# Patient Record
Sex: FEMALE | Race: NATIVE HAWAIIAN OR OTHER PACIFIC ISLANDER | Employment: UNEMPLOYED | URBAN - METROPOLITAN AREA
[De-identification: names, ages, dates, MRNs, and addresses within clinical notes are randomized per-mention and may not be internally consistent; named-entity substitution may affect disease eponyms.]

---

## 2020-11-03 ENCOUNTER — OFFICE VISIT (OUTPATIENT)
Dept: INTERNAL MEDICINE | Facility: CLINIC | Age: 35
End: 2020-11-03

## 2020-11-03 VITALS
HEART RATE: 109 BPM | TEMPERATURE: 99.1 F | DIASTOLIC BLOOD PRESSURE: 64 MMHG | HEIGHT: 68 IN | OXYGEN SATURATION: 99 % | RESPIRATION RATE: 16 BRPM | BODY MASS INDEX: 24.64 KG/M2 | WEIGHT: 162.6 LBS | SYSTOLIC BLOOD PRESSURE: 102 MMHG

## 2020-11-03 DIAGNOSIS — S22.41XD CLOSED FRACTURE OF MULTIPLE RIBS OF RIGHT SIDE WITH ROUTINE HEALING, SUBSEQUENT ENCOUNTER: Primary | ICD-10-CM

## 2020-11-03 DIAGNOSIS — M25.562 ACUTE PAIN OF LEFT KNEE: ICD-10-CM

## 2020-11-03 PROCEDURE — 99203 OFFICE O/P NEW LOW 30 MIN: CPT | Performed by: NURSE PRACTITIONER

## 2020-11-03 RX ORDER — ACETAMINOPHEN 500 MG
1000 TABLET ORAL EVERY 6 HOURS PRN
COMMUNITY

## 2020-11-03 RX ORDER — OXYCODONE HYDROCHLORIDE 5 MG/1
5 TABLET ORAL EVERY 6 HOURS PRN
Qty: 10 TABLET | Refills: 0 | Status: SHIPPED | OUTPATIENT
Start: 2020-11-03 | End: 2020-11-06

## 2020-11-03 SDOH — HEALTH STABILITY: MENTAL HEALTH: HOW OFTEN DO YOU HAVE A DRINK CONTAINING ALCOHOL?: NEVER

## 2020-11-03 ASSESSMENT — MIFFLIN-ST. JEOR: SCORE: 1471.05

## 2020-11-03 NOTE — NURSING NOTE
"patient fell of scafolding yesterday at work, fell 8 feet, has a few broken ribs and left knee pain.  Vital signs:  Temp: 99.1  F (37.3  C) Temp src: Oral BP: 102/64 Pulse: 109   Resp: 16 SpO2: 99 %     Height: 172.7 cm (5' 8\") Weight: 73.8 kg (162 lb 9.6 oz)  Estimated body mass index is 24.72 kg/m  as calculated from the following:    Height as of this encounter: 1.727 m (5' 8\").    Weight as of this encounter: 73.8 kg (162 lb 9.6 oz).          "

## 2020-11-03 NOTE — PROGRESS NOTES
"Subjective     Steffanie Martin is a 35 year old female who presents to clinic today for the following health issues:    HPI         Musculoskeletal problem/pain  Onset/Duration: 2 days  Description  Location: knee - left  Joint Swelling: YES  Redness: no  Pain: YES  Warmth: no  Intensity:  moderate  Progression of Symptoms:  same  Accompanying signs and symptoms:   Fevers: no  Numbness/tingling/weakness: no  History  Trauma to the area: YES- fell 8 feet off of scaffolding at work site, seen at , noted normal knee xrays, 3 rib fractures on R    Recent illness:  no  Previous similar problem: no  Previous evaluation:  YES, outside of [facility]  Precipitating or alleviating factors:  Aggravating factors include: walking  Therapies tried and outcome: oxycodone    New Patient/Transfer of Care    Review of Systems   Constitutional, HEENT, cardiovascular, pulmonary, gi and gu systems are negative, except as otherwise noted.      Objective    /64 (BP Location: Right arm, Patient Position: Sitting, Cuff Size: Adult Large)   Pulse 109   Temp 99.1  F (37.3  C) (Oral)   Resp 16   Ht 1.727 m (5' 8\")   Wt 73.8 kg (162 lb 9.6 oz)   LMP  (LMP Unknown)   SpO2 99%   BMI 24.72 kg/m    Body mass index is 24.72 kg/m .  Physical Exam   GENERAL: healthy, alert and no distress  MS: L knee w/o effusion.  Normal ROM            Assessment & Plan     Closed fracture of multiple ribs of right side with routine healing, subsequent encounter    - oxyCODONE (ROXICODONE) 5 MG tablet; Take 1 tablet (5 mg) by mouth every 6 hours as needed for pain    Acute pain of left knee    - Orthopedic [facility] Sp[facility]  Referral; Future     Tobacco Cessation:   reports that she has been smoking. She has never used smokeless tobacco.           NSAIDs, ice/heat  Rib belt prn    Lucía Patel NP  [facility]    "